# Patient Record
Sex: FEMALE | Race: WHITE | NOT HISPANIC OR LATINO | URBAN - METROPOLITAN AREA
[De-identification: names, ages, dates, MRNs, and addresses within clinical notes are randomized per-mention and may not be internally consistent; named-entity substitution may affect disease eponyms.]

---

## 2017-06-21 ENCOUNTER — IMPORTED ENCOUNTER (OUTPATIENT)
Dept: URBAN - METROPOLITAN AREA CLINIC 50 | Facility: CLINIC | Age: 67
End: 2017-06-21

## 2018-02-02 ENCOUNTER — IMPORTED ENCOUNTER (OUTPATIENT)
Dept: URBAN - METROPOLITAN AREA CLINIC 50 | Facility: CLINIC | Age: 68
End: 2018-02-02

## 2018-10-09 ENCOUNTER — IMPORTED ENCOUNTER (OUTPATIENT)
Dept: URBAN - METROPOLITAN AREA CLINIC 50 | Facility: CLINIC | Age: 68
End: 2018-10-09

## 2018-10-09 NOTE — PATIENT DISCUSSION
"""DIscussed with patient. Her eyes don't look dry today.  Patient says she has a slight tremor and ""

## 2019-08-21 ENCOUNTER — IMPORTED ENCOUNTER (OUTPATIENT)
Dept: URBAN - METROPOLITAN AREA CLINIC 50 | Facility: CLINIC | Age: 69
End: 2019-08-21

## 2019-08-21 NOTE — PATIENT DISCUSSION
"""Patient states BUL have become bothersome and she feels like she can see better when she lifts ""

## 2020-04-29 ENCOUNTER — IMPORTED ENCOUNTER (OUTPATIENT)
Dept: URBAN - METROPOLITAN AREA CLINIC 50 | Facility: CLINIC | Age: 70
End: 2020-04-29

## 2020-05-18 ENCOUNTER — IMPORTED ENCOUNTER (OUTPATIENT)
Dept: URBAN - METROPOLITAN AREA CLINIC 50 | Facility: CLINIC | Age: 70
End: 2020-05-18

## 2020-06-03 ENCOUNTER — IMPORTED ENCOUNTER (OUTPATIENT)
Dept: URBAN - METROPOLITAN AREA CLINIC 50 | Facility: CLINIC | Age: 70
End: 2020-06-03

## 2020-07-15 ENCOUNTER — IMPORTED ENCOUNTER (OUTPATIENT)
Dept: URBAN - METROPOLITAN AREA CLINIC 50 | Facility: CLINIC | Age: 70
End: 2020-07-15

## 2021-04-18 ASSESSMENT — VISUAL ACUITY
OS_CC: J2@ 18 IN
OS_BAT: 20/50
OD_SC: 20/40-
OS_OTHER: 20/30. 20/40.
OS_CC: 20/30-
OD_PH: 20/20
OD_CC: J1@ 16 IN
OS_SC: 20/40
OD_CC: 20/25
OS_SC: 20/50-
OD_OTHER: 20/40. 20/60.
OD_OTHER: 20/30. 20/50.
OD_BAT: 20/40
OS_CC: J1+
OS_BAT: 20/50
OS_CC: J1@ 16 IN
OD_CC: J1+
OD_BAT: 20/30
OD_CC: J2@ 18 IN
OS_OTHER: 20/40. 20/50.
OD_SC: 20/40-
OS_BAT: 20/30
OD_BAT: 20/30+
OS_PH: 20/25-
OS_CC: J1+@ 13 IN
OD_CC: J1+@ 13 IN
OS_SC: 20/50-
OD_BAT: 20/30
OD_OTHER: 20/30. 20/40.
OD_SC: 20/40
OS_PH: @ 18 IN
OS_CC: 20/40+1
OS_OTHER: 20/50. 20/80.
OD_CC: 20/40+2
OS_OTHER: 20/50. 20/70.
OS_BAT: 20/40
OD_PH: @ 18 IN

## 2021-04-18 ASSESSMENT — PACHYMETRY
OD_CT_UM: 600
OD_CT_UM: 600
OS_CT_UM: 596
OD_CT_UM: 600
OS_CT_UM: 596
OD_CT_UM: 600
OS_CT_UM: 596
OD_CT_UM: 600
OD_CT_UM: 600
OS_CT_UM: 596
OD_CT_UM: 600
OS_CT_UM: 596

## 2021-04-18 ASSESSMENT — TONOMETRY
OD_IOP_MMHG: 12
OD_IOP_MMHG: 15
OD_IOP_MMHG: 12
OS_IOP_MMHG: 16
OD_IOP_MMHG: 15
OS_IOP_MMHG: 16
OD_IOP_MMHG: 18
OS_IOP_MMHG: 15
OS_IOP_MMHG: 18
OD_IOP_MMHG: 15
OD_IOP_MMHG: 16
OS_IOP_MMHG: 18
OS_IOP_MMHG: 13
OS_IOP_MMHG: 15

## 2022-12-09 ENCOUNTER — COMPREHENSIVE EXAM (OUTPATIENT)
Dept: URBAN - METROPOLITAN AREA CLINIC 52 | Facility: CLINIC | Age: 72
End: 2022-12-09

## 2022-12-09 PROCEDURE — 92014 COMPRE OPH EXAM EST PT 1/>: CPT

## 2022-12-09 ASSESSMENT — VISUAL ACUITY
OS_GLARE: 20/60
OD_SC: 20/50
OS_PH: 20/25-1
OD_PH: 20/25
OD_GLARE: 20/60
OS_SC: 20/50-1

## 2022-12-09 ASSESSMENT — TONOMETRY
OS_IOP_MMHG: 13
OD_IOP_MMHG: 15

## 2022-12-09 NOTE — PATIENT DISCUSSION
Explained to patient the importance of getting the drops in more frequently. Recommended patient to use drops TID-QID and Gel drops or lesvia at night. Explained to patient that to stick to this regimen for a week, to see if the lights are still bothersome. If the lights are not bothersome anymore explained that if was the dryness and recommended to keep using drops as directed. If the lights are still bothersome, explained to patient that could be the cataract.

## 2022-12-09 NOTE — PATIENT DISCUSSION
"""Recommend Bilateral Upper Lid Blepharoplasty 08/11/2020 at [facility].  Discussed with patient JANESSA

## 2022-12-09 NOTE — PATIENT DISCUSSION
Patient to have a refraction at follow up appointment. If dryness has improved and glare is not a problem patient can have RX.

## 2023-01-16 ENCOUNTER — FOLLOW UP (OUTPATIENT)
Dept: URBAN - METROPOLITAN AREA CLINIC 49 | Facility: CLINIC | Age: 73
End: 2023-01-16

## 2023-01-16 DIAGNOSIS — H02.831: ICD-10-CM

## 2023-01-16 DIAGNOSIS — H02.834: ICD-10-CM

## 2023-01-16 DIAGNOSIS — H04.123: ICD-10-CM

## 2023-01-16 DIAGNOSIS — H52.4: ICD-10-CM

## 2023-01-16 DIAGNOSIS — H25.13: ICD-10-CM

## 2023-01-16 PROCEDURE — 92015 DETERMINE REFRACTIVE STATE: CPT

## 2023-01-16 PROCEDURE — 92012 INTRM OPH EXAM EST PATIENT: CPT

## 2023-01-16 ASSESSMENT — VISUAL ACUITY
OD_PH: 20/30
OS_SC: 20/50
OS_PH: 20/30
OD_SC: 20/50

## 2023-01-16 ASSESSMENT — TONOMETRY
OS_IOP_MMHG: 13
OD_IOP_MMHG: 12

## 2023-01-16 NOTE — PATIENT DISCUSSION
"""Recommend Bilateral Upper Lid Blepharoplasty 08/11/2020 at [facility].  Discussed with patient JANESSA

## 2023-01-16 NOTE — PATIENT DISCUSSION
Patient did the testing in 2020, qualifies. She wishes to proceed with surgery. Surgical questions scanned into chart.

## 2023-01-16 NOTE — PATIENT DISCUSSION
Patient reports not using the gel drops at night, recommended patient start using them. Patient would like to try the glasses rx first to see if there is any improvement before deciding on cataract surgery.